# Patient Record
Sex: MALE | Race: BLACK OR AFRICAN AMERICAN | Employment: FULL TIME | URBAN - METROPOLITAN AREA
[De-identification: names, ages, dates, MRNs, and addresses within clinical notes are randomized per-mention and may not be internally consistent; named-entity substitution may affect disease eponyms.]

---

## 2018-04-16 ENCOUNTER — HOSPITAL ENCOUNTER (OUTPATIENT)
Dept: OTHER | Age: 48
Discharge: HOME | End: 2018-04-16
Attending: INTERNAL MEDICINE | Admitting: INTERNAL MEDICINE

## 2018-04-16 ENCOUNTER — OFFICE VISIT (OUTPATIENT)
Dept: OTHER | Age: 48
End: 2018-04-16

## 2018-04-16 VITALS
WEIGHT: 213 LBS | SYSTOLIC BLOOD PRESSURE: 122 MMHG | HEIGHT: 75 IN | DIASTOLIC BLOOD PRESSURE: 84 MMHG | BODY MASS INDEX: 26.49 KG/M2 | OXYGEN SATURATION: 98 % | HEART RATE: 95 BPM

## 2018-04-16 DIAGNOSIS — J45.20 MILD INTERMITTENT ASTHMA WITHOUT COMPLICATION: ICD-10-CM

## 2018-04-16 DIAGNOSIS — D50.0 IRON DEFICIENCY ANEMIA DUE TO CHRONIC BLOOD LOSS: Primary | ICD-10-CM

## 2018-04-16 DIAGNOSIS — K62.5 RECTAL BLEEDING: ICD-10-CM

## 2018-04-16 PROCEDURE — 99999 PR OFFICE/OUTPT VISIT,PROCEDURE ONLY: CPT | Performed by: INTERNAL MEDICINE

## 2018-04-16 RX ORDER — ATORVASTATIN CALCIUM 20 MG/1
20 TABLET, FILM COATED ORAL DAILY
COMMUNITY
End: 2018-04-30 | Stop reason: SDUPTHER

## 2018-04-16 RX ORDER — PNV NO.95/FERROUS FUM/FOLIC AC 28MG-0.8MG
TABLET ORAL 2 TIMES DAILY
COMMUNITY
End: 2022-01-27

## 2018-04-16 RX ORDER — ALBUTEROL SULFATE 90 UG/1
2 AEROSOL, METERED RESPIRATORY (INHALATION) EVERY 6 HOURS PRN
COMMUNITY
End: 2022-09-12 | Stop reason: SDUPTHER

## 2018-04-26 ENCOUNTER — TELEPHONE (OUTPATIENT)
Dept: OTHER | Age: 48
End: 2018-04-26

## 2018-04-26 RX ORDER — AMLODIPINE BESYLATE 5 MG/1
5 TABLET ORAL DAILY
COMMUNITY
End: 2018-04-30 | Stop reason: SDUPTHER

## 2018-04-26 RX ORDER — TAMSULOSIN HYDROCHLORIDE 0.4 MG/1
0.4 CAPSULE ORAL DAILY
COMMUNITY
End: 2018-04-30 | Stop reason: SDUPTHER

## 2018-04-26 RX ORDER — DOCUSATE SODIUM 100 MG/1
100 CAPSULE, LIQUID FILLED ORAL 2 TIMES DAILY
COMMUNITY
End: 2022-01-27

## 2018-04-30 ENCOUNTER — HOSPITAL ENCOUNTER (OUTPATIENT)
Dept: OTHER | Age: 48
Discharge: HOME | End: 2018-04-30
Attending: INTERNAL MEDICINE | Admitting: INTERNAL MEDICINE

## 2018-04-30 ENCOUNTER — OFFICE VISIT (OUTPATIENT)
Dept: OTHER | Age: 48
End: 2018-04-30

## 2018-04-30 VITALS
WEIGHT: 211 LBS | OXYGEN SATURATION: 97 % | DIASTOLIC BLOOD PRESSURE: 82 MMHG | SYSTOLIC BLOOD PRESSURE: 118 MMHG | BODY MASS INDEX: 26.37 KG/M2 | HEART RATE: 88 BPM

## 2018-04-30 DIAGNOSIS — K62.5 RECTAL BLEEDING: Primary | ICD-10-CM

## 2018-04-30 DIAGNOSIS — J45.20 MILD INTERMITTENT ASTHMA WITHOUT COMPLICATION: ICD-10-CM

## 2018-04-30 DIAGNOSIS — D50.0 IRON DEFICIENCY ANEMIA DUE TO CHRONIC BLOOD LOSS: ICD-10-CM

## 2018-04-30 DIAGNOSIS — K62.5 RECTAL BLEEDING: ICD-10-CM

## 2018-04-30 PROCEDURE — 99999 PR OFFICE/OUTPT VISIT,PROCEDURE ONLY: CPT | Performed by: INTERNAL MEDICINE

## 2018-04-30 RX ORDER — FLUTICASONE PROPIONATE 50 MCG
2 SPRAY, SUSPENSION (ML) NASAL NIGHTLY
Qty: 1 BOTTLE | Refills: 3 | Status: SHIPPED | OUTPATIENT
Start: 2018-04-30 | End: 2022-01-27

## 2018-04-30 RX ORDER — AMLODIPINE BESYLATE 5 MG/1
5 TABLET ORAL DAILY
Qty: 30 TABLET | Refills: 5 | Status: SHIPPED | OUTPATIENT
Start: 2018-04-30 | End: 2018-04-30 | Stop reason: SDUPTHER

## 2018-04-30 RX ORDER — AMLODIPINE BESYLATE 5 MG/1
5 TABLET ORAL DAILY
Qty: 30 TABLET | Refills: 5 | Status: SHIPPED | OUTPATIENT
Start: 2018-04-30 | End: 2022-01-27 | Stop reason: SDUPTHER

## 2018-04-30 RX ORDER — ATORVASTATIN CALCIUM 20 MG/1
20 TABLET, FILM COATED ORAL DAILY
Qty: 30 TABLET | Refills: 5 | Status: SHIPPED | OUTPATIENT
Start: 2018-04-30 | End: 2022-01-13 | Stop reason: SDUPTHER

## 2018-04-30 RX ORDER — TAMSULOSIN HYDROCHLORIDE 0.4 MG/1
0.4 CAPSULE ORAL DAILY
Qty: 30 CAPSULE | Refills: 5 | Status: SHIPPED | OUTPATIENT
Start: 2018-04-30 | End: 2018-04-30 | Stop reason: SDUPTHER

## 2018-04-30 RX ORDER — ATORVASTATIN CALCIUM 20 MG/1
20 TABLET, FILM COATED ORAL DAILY
Qty: 30 TABLET | Refills: 5 | Status: SHIPPED | OUTPATIENT
Start: 2018-04-30 | End: 2018-04-30 | Stop reason: SDUPTHER

## 2018-04-30 RX ORDER — TAMSULOSIN HYDROCHLORIDE 0.4 MG/1
0.4 CAPSULE ORAL DAILY
Qty: 30 CAPSULE | Refills: 5 | Status: SHIPPED | OUTPATIENT
Start: 2018-04-30 | End: 2022-01-27

## 2018-05-02 ENCOUNTER — HOSPITAL ENCOUNTER (OUTPATIENT)
Dept: SURGERY | Age: 48
Discharge: HOME | End: 2018-05-02
Attending: INTERNAL MEDICINE | Admitting: INTERNAL MEDICINE

## 2018-05-02 VITALS
OXYGEN SATURATION: 95 % | HEART RATE: 77 BPM | DIASTOLIC BLOOD PRESSURE: 79 MMHG | RESPIRATION RATE: 20 BRPM | HEIGHT: 76 IN | SYSTOLIC BLOOD PRESSURE: 112 MMHG | BODY MASS INDEX: 26.06 KG/M2 | TEMPERATURE: 98.6 F | WEIGHT: 214 LBS

## 2018-05-02 LAB
HCT VFR BLD CALC: 36.3 % (ref 40.5–52.5)
HEMOGLOBIN: 11.2 G/DL (ref 13.5–17.5)
MCH RBC QN AUTO: 23.4 PG (ref 26–34)
MCHC RBC AUTO-ENTMCNC: 30.8 G/DL (ref 31–36)
MCV RBC AUTO: 75.7 FL (ref 80–100)
PDW BLD-RTO: 33.2 % (ref 12.4–15.4)
PLATELET # BLD: 360 K/UL (ref 135–450)
PMV BLD AUTO: 8.5 FL (ref 5–10.5)
RBC # BLD: 4.79 M/UL (ref 4.2–5.9)
WBC # BLD: 3.5 K/UL (ref 4–11)

## 2018-05-02 RX ORDER — SODIUM CHLORIDE, SODIUM LACTATE, POTASSIUM CHLORIDE, CALCIUM CHLORIDE 600; 310; 30; 20 MG/100ML; MG/100ML; MG/100ML; MG/100ML
INJECTION, SOLUTION INTRAVENOUS CONTINUOUS
Status: DISCONTINUED | OUTPATIENT
Start: 2018-05-02 | End: 2018-05-03 | Stop reason: HOSPADM

## 2018-05-02 RX ADMIN — SODIUM CHLORIDE, SODIUM LACTATE, POTASSIUM CHLORIDE, CALCIUM CHLORIDE: 600; 310; 30; 20 INJECTION, SOLUTION INTRAVENOUS at 10:48

## 2018-05-02 ASSESSMENT — PAIN - FUNCTIONAL ASSESSMENT: PAIN_FUNCTIONAL_ASSESSMENT: 0-10

## 2018-05-02 ASSESSMENT — PAIN SCALES - GENERAL
PAINLEVEL_OUTOF10: 0
PAINLEVEL_OUTOF10: 0

## 2018-05-23 ENCOUNTER — HOSPITAL ENCOUNTER (OUTPATIENT)
Dept: SURGERY | Age: 48
Discharge: HOME | End: 2018-05-23
Attending: INTERNAL MEDICINE | Admitting: INTERNAL MEDICINE

## 2018-05-23 VITALS
OXYGEN SATURATION: 98 % | BODY MASS INDEX: 26.06 KG/M2 | RESPIRATION RATE: 16 BRPM | SYSTOLIC BLOOD PRESSURE: 120 MMHG | TEMPERATURE: 97.7 F | HEIGHT: 76 IN | DIASTOLIC BLOOD PRESSURE: 69 MMHG | HEART RATE: 80 BPM | WEIGHT: 214 LBS

## 2018-05-23 RX ORDER — SODIUM CHLORIDE, SODIUM LACTATE, POTASSIUM CHLORIDE, CALCIUM CHLORIDE 600; 310; 30; 20 MG/100ML; MG/100ML; MG/100ML; MG/100ML
INJECTION, SOLUTION INTRAVENOUS CONTINUOUS
Status: CANCELLED | OUTPATIENT
Start: 2018-05-23

## 2018-05-23 RX ORDER — ESOMEPRAZOLE MAGNESIUM 40 MG/1
40 CAPSULE, DELAYED RELEASE ORAL DAILY
Qty: 30 CAPSULE | Refills: 3 | Status: SHIPPED | OUTPATIENT
Start: 2018-05-23 | End: 2022-01-27

## 2018-05-23 ASSESSMENT — PAIN SCALES - GENERAL
PAINLEVEL_OUTOF10: 0
PAINLEVEL_OUTOF10: 0

## 2018-06-11 ENCOUNTER — HOSPITAL ENCOUNTER (OUTPATIENT)
Dept: OTHER | Age: 48
Discharge: HOME | End: 2018-06-12
Attending: INTERNAL MEDICINE | Admitting: INTERNAL MEDICINE

## 2018-06-11 ENCOUNTER — OFFICE VISIT (OUTPATIENT)
Dept: OTHER | Age: 48
End: 2018-06-11

## 2018-06-11 VITALS
BODY MASS INDEX: 25.69 KG/M2 | WEIGHT: 211 LBS | HEART RATE: 93 BPM | DIASTOLIC BLOOD PRESSURE: 88 MMHG | HEIGHT: 76 IN | SYSTOLIC BLOOD PRESSURE: 130 MMHG | OXYGEN SATURATION: 99 %

## 2018-06-11 DIAGNOSIS — D50.0 IRON DEFICIENCY ANEMIA DUE TO CHRONIC BLOOD LOSS: ICD-10-CM

## 2018-06-11 DIAGNOSIS — K62.5 RECTAL BLEEDING: Primary | ICD-10-CM

## 2018-06-11 DIAGNOSIS — K64.5 THROMBOSED HEMORRHOIDS: ICD-10-CM

## 2018-06-11 DIAGNOSIS — J45.20 MILD INTERMITTENT ASTHMA WITHOUT COMPLICATION: ICD-10-CM

## 2018-06-11 PROCEDURE — 99999 PR OFFICE/OUTPT VISIT,PROCEDURE ONLY: CPT | Performed by: INTERNAL MEDICINE

## 2018-06-18 ENCOUNTER — INITIAL CONSULT (OUTPATIENT)
Dept: SURGERY | Age: 48
End: 2018-06-18

## 2018-06-18 VITALS
HEART RATE: 61 BPM | WEIGHT: 204.8 LBS | SYSTOLIC BLOOD PRESSURE: 92 MMHG | DIASTOLIC BLOOD PRESSURE: 69 MMHG | BODY MASS INDEX: 24.94 KG/M2 | HEIGHT: 76 IN

## 2018-06-18 DIAGNOSIS — K64.8 OTHER HEMORRHOIDS: Primary | ICD-10-CM

## 2018-06-18 PROCEDURE — 99243 OFF/OP CNSLTJ NEW/EST LOW 30: CPT | Performed by: SURGERY

## 2018-06-18 RX ORDER — HYDROCORTISONE ACETATE 25 MG/1
25 SUPPOSITORY RECTAL DAILY
Qty: 14 SUPPOSITORY | Refills: 1 | Status: SHIPPED | OUTPATIENT
Start: 2018-06-18 | End: 2022-01-27 | Stop reason: ALTCHOICE

## 2018-06-20 PROBLEM — K64.8 OTHER HEMORRHOIDS: Status: ACTIVE | Noted: 2018-06-20

## 2021-12-30 ENCOUNTER — TELEPHONE (OUTPATIENT)
Dept: INTERNAL MEDICINE CLINIC | Age: 51
End: 2021-12-30

## 2022-01-13 ENCOUNTER — OFFICE VISIT (OUTPATIENT)
Dept: INTERNAL MEDICINE CLINIC | Age: 52
End: 2022-01-13

## 2022-01-13 ENCOUNTER — HOSPITAL ENCOUNTER (OUTPATIENT)
Age: 52
Discharge: HOME | End: 2022-01-13

## 2022-01-13 VITALS
BODY MASS INDEX: 26.3 KG/M2 | DIASTOLIC BLOOD PRESSURE: 72 MMHG | WEIGHT: 216 LBS | HEIGHT: 76 IN | SYSTOLIC BLOOD PRESSURE: 134 MMHG | OXYGEN SATURATION: 99 % | HEART RATE: 101 BPM

## 2022-01-13 DIAGNOSIS — E61.1 IRON DEFICIENCY: ICD-10-CM

## 2022-01-13 DIAGNOSIS — K64.5 THROMBOSED HEMORRHOIDS: Primary | ICD-10-CM

## 2022-01-13 DIAGNOSIS — I25.83 CORONARY ARTERY DISEASE DUE TO LIPID RICH PLAQUE: ICD-10-CM

## 2022-01-13 DIAGNOSIS — N40.1 BENIGN PROSTATIC HYPERPLASIA WITH URINARY HESITANCY: ICD-10-CM

## 2022-01-13 DIAGNOSIS — R39.11 BENIGN PROSTATIC HYPERPLASIA WITH URINARY HESITANCY: ICD-10-CM

## 2022-01-13 DIAGNOSIS — I25.10 CORONARY ARTERY DISEASE DUE TO LIPID RICH PLAQUE: ICD-10-CM

## 2022-01-13 LAB
A/G RATIO: 1.4 (ref 1.1–2.2)
ALBUMIN SERPL-MCNC: 4.4 G/DL (ref 3.4–5)
ALP BLD-CCNC: 64 U/L (ref 40–129)
ALT SERPL-CCNC: 27 U/L (ref 10–40)
ANION GAP SERPL CALCULATED.3IONS-SCNC: 12 MMOL/L (ref 3–16)
AST SERPL-CCNC: 29 U/L (ref 15–37)
BASOPHILS ABSOLUTE: 0.1 K/UL (ref 0–0.2)
BASOPHILS RELATIVE PERCENT: 1.7 %
BILIRUB SERPL-MCNC: <0.2 MG/DL (ref 0–1)
BUN BLDV-MCNC: 10 MG/DL (ref 7–20)
CALCIUM SERPL-MCNC: 9.4 MG/DL (ref 8.3–10.6)
CHLORIDE BLD-SCNC: 101 MMOL/L (ref 99–110)
CHOLESTEROL, TOTAL: 215 MG/DL (ref 0–199)
CO2: 22 MMOL/L (ref 21–32)
CREAT SERPL-MCNC: 1.1 MG/DL (ref 0.9–1.3)
EOSINOPHILS ABSOLUTE: 0.2 K/UL (ref 0–0.6)
EOSINOPHILS RELATIVE PERCENT: 4.7 %
GFR AFRICAN AMERICAN: >60
GFR NON-AFRICAN AMERICAN: >60
GLUCOSE BLD-MCNC: 96 MG/DL (ref 70–99)
HCT VFR BLD CALC: 31.7 % (ref 40.5–52.5)
HDLC SERPL-MCNC: 42 MG/DL (ref 40–60)
HEMOGLOBIN: 9.9 G/DL (ref 13.5–17.5)
LDL CHOLESTEROL CALCULATED: ABNORMAL MG/DL
LDL CHOLESTEROL DIRECT: 66 MG/DL
LYMPHOCYTES ABSOLUTE: 1.2 K/UL (ref 1–5.1)
LYMPHOCYTES RELATIVE PERCENT: 22.7 %
MCH RBC QN AUTO: 25.9 PG (ref 26–34)
MCHC RBC AUTO-ENTMCNC: 31.3 G/DL (ref 31–36)
MCV RBC AUTO: 82.7 FL (ref 80–100)
MONOCYTES ABSOLUTE: 0.7 K/UL (ref 0–1.3)
MONOCYTES RELATIVE PERCENT: 13.7 %
NEUTROPHILS ABSOLUTE: 2.9 K/UL (ref 1.7–7.7)
NEUTROPHILS RELATIVE PERCENT: 57.2 %
PDW BLD-RTO: 17.3 % (ref 12.4–15.4)
PLATELET # BLD: 334 K/UL (ref 135–450)
PMV BLD AUTO: 7 FL (ref 5–10.5)
POTASSIUM SERPL-SCNC: 4.5 MMOL/L (ref 3.5–5.1)
PROSTATE SPECIFIC ANTIGEN: 0.77 NG/ML (ref 0–4)
RBC # BLD: 3.83 M/UL (ref 4.2–5.9)
SODIUM BLD-SCNC: 135 MMOL/L (ref 136–145)
TOTAL PROTEIN: 7.5 G/DL (ref 6.4–8.2)
TRIGL SERPL-MCNC: 618 MG/DL (ref 0–150)
VLDLC SERPL CALC-MCNC: ABNORMAL MG/DL
WBC # BLD: 5.1 K/UL (ref 4–11)

## 2022-01-13 PROCEDURE — 80053 COMPREHEN METABOLIC PANEL: CPT

## 2022-01-13 PROCEDURE — 36415 COLL VENOUS BLD VENIPUNCTURE: CPT

## 2022-01-13 PROCEDURE — 83721 ASSAY OF BLOOD LIPOPROTEIN: CPT

## 2022-01-13 PROCEDURE — 85025 COMPLETE CBC W/AUTO DIFF WBC: CPT

## 2022-01-13 PROCEDURE — 80061 LIPID PANEL: CPT

## 2022-01-13 PROCEDURE — 84153 ASSAY OF PSA TOTAL: CPT

## 2022-01-13 PROCEDURE — 99214 OFFICE O/P EST MOD 30 MIN: CPT | Performed by: INTERNAL MEDICINE

## 2022-01-13 RX ORDER — ATORVASTATIN CALCIUM 40 MG/1
40 TABLET, FILM COATED ORAL DAILY
Qty: 30 TABLET | Refills: 5 | Status: SHIPPED | OUTPATIENT
Start: 2022-01-13

## 2022-01-13 NOTE — PATIENT INSTRUCTIONS
1. Rectal bleeding consistent with hemorrhoids:  You may have a thrombosed hemorrhoid which may require surgery. Continue to use Preparation H with witch hazel three times per day per rectum for now.  We will refer to general surgery for evaluation. 2. History of iron deficiency anemia:   We will check CBC (blood count) and iron levels today. 3. History of coronary artery disease with a history of myocardial infarction:  Continue taking aspirin 81 mg daily. START back taking atorvastatin (Lipitor) 40 mg every evening.    4. Hypertension:   Blood pressure is reasonable today at 134/72 (goal less than 140/90).         Follow-up in two weeks  LABS: CBC, CMP, lipid panel, PSA

## 2022-01-13 NOTE — PROGRESS NOTES
SUBJECTIVE:   Follow up, last visit 2018. Taking aspirin 81 mg daily and vitamin. No other medication.        History of present illness:   History of rectal bleeding for the past 8 years intermittently, usually after a bowel movement. Yanique Garcia thinks he may have polyps, which may be hemorrhoids.  He is usually follows at the [facility] ([address] [facility], Dr. Gunjan Benjamin), last seen in mid February.  Blood tests apparently showed anemia, started on iron tablet three weeks ago. Richard Martinouch to get a colonoscopy due to cost and sliding fee Elisha Huff was referred to [facility] by employee.  History of MI fours years ago, treated at [address] [place] [address] with angioplasty. Yanique Garcia is on aspirin and atorvastatin. Yanique Garcia is active smoker currently at 2 cigs/day with a 20 pk/yr smoking history.  He uses an albuterol inhaler infrequently 2-4 times per month.  Great uncle  of colon cancer age 62s.  No other family history of colorectal cancer.  Mother with lung cancer (smoker). Colonoscopy  and EGD  (Dr. Sebastián Jimenez). Colonoscopy showed diverticulosis and hemorrhoids. EGD c/w gastritis, started on Nexium by Dr. Sebastián Jimenez.            EGD () Esophagus: abnormal: with Rings without furrows in the [address], suggestive of eosinophilic esophagitis, biopsied. Apparent erosion at gastroesophageal junction, biopsied. The findings do not support a diagnosis of Ballesteros's Esophagus.   Stomach: abnormal: small hiatal hernia; apparent antral gastritis, biopsied  Duodenum: normal    MEDICATIONS:  esomeprazole (NEXIUM) 40 MG capsule Take 1 capsule by mouth daily (not taking)   fluticasone (FLONASE) 50 MCG/ACT nasal spray 2 sprays by Nasal route nightly (not taking)   amLODIPine (NORVASC) 5 MG tablet Take 1 tablet by mouth daily (not taking)   atorvastatin (LIPITOR) 20 MG tablet Take 1 tablet by mouth daily (not taking)   tamsulosin (FLOMAX) 0.4 MG capsule Take 1 capsule by mouth daily (not taking)   docusate sodium (COLACE) 100 MG capsule Take 100 mg by mouth 2 times daily (not taking)   albuterol sulfate  MCG/ACT Inhale 2 puffs into the lungs q6hr PRN (not using)   Ferrous Sulfate (IRON) 325 (65 Fe) MG TABS Take by mouth 2 times daily (not taking)   aspirin 81 MG tablet Take 81 mg by mouth daily      No current facility-administered medications for this visit. LABS: 01/13/2022  WBC 5.1   HGB 9.9 (L)      MCV 82.7      (L)   K 4.5      CO2 22   BUN 10   CREATININE 1.1   GLUCOSE 96     Calcium 9.4    Total Protein 7.5    Albumin 4.4    Total Bilirubin <0.2    Alkaline Phosphatase 64    ALT 27    AST 29      Total Cholesterol 215 (H)   Triglycerides 618 (H)   HDL 42    Direct LDL  66        PSA (1/13) 0.77 ng/mL      OBJECTIVE:    /72 (Site: Right Upper Arm, Position: Sitting)   Pulse 101   Ht 6' 4\" (1.93 m)   Wt 216 lb (98 kg)   SpO2 99%   BMI 26.29 kg/m²   HEENT:  Oropharynx clear, no lymphadenopathy,   LUNGS:  Clear and without wheezes  HEART:  Regular rhythm, no appreciable murmur  ABD:  Benign, active bowel sounds  EXT:  No edema, pulses palpable  NEURO:  No focal neurologic deficits noted. ASSESSMENT / PLAN:   1. Rectal bleeding consistent with hemorrhoids:  You may have a thrombosed hemorrhoid which may require surgery. Continue to use Preparation H with witch hazel three times per day per rectum for now.  We will refer to general surgery for evaluation. 2. History of iron deficiency anemia:   Normocytic anemia with Hgb 9.9 gm/dL today. Check iron studies and B12 level. 3. History of coronary artery disease with a history of myocardial infarction:  Continue taking aspirin 81 mg daily. START back taking atorvastatin (Lipitor) 40 mg every evening.  Direct LDL 66 mg/dL. Recheck fasting triglyceride level. 4. Hypertension:   Blood pressure is borderline today at 134/72 (goal less than 130/80). Will consider starting back on amlodipine.        Follow-up in two weeks  LABS: CBC, CMP, lipid panel, PSA

## 2022-01-27 ENCOUNTER — OFFICE VISIT (OUTPATIENT)
Dept: INTERNAL MEDICINE CLINIC | Age: 52
End: 2022-01-27

## 2022-01-27 ENCOUNTER — HOSPITAL ENCOUNTER (OUTPATIENT)
Age: 52
Discharge: HOME | End: 2022-01-27
Payer: COMMERCIAL

## 2022-01-27 VITALS — SYSTOLIC BLOOD PRESSURE: 140 MMHG | BODY MASS INDEX: 26.29 KG/M2 | DIASTOLIC BLOOD PRESSURE: 80 MMHG | HEIGHT: 76 IN

## 2022-01-27 DIAGNOSIS — E78.1 HYPERTRIGLYCERIDEMIA: ICD-10-CM

## 2022-01-27 DIAGNOSIS — K64.5 THROMBOSED HEMORRHOIDS: ICD-10-CM

## 2022-01-27 DIAGNOSIS — D50.0 IRON DEFICIENCY ANEMIA DUE TO CHRONIC BLOOD LOSS: ICD-10-CM

## 2022-01-27 DIAGNOSIS — F51.01 PRIMARY INSOMNIA: ICD-10-CM

## 2022-01-27 DIAGNOSIS — N40.1 BENIGN PROSTATIC HYPERPLASIA WITH URINARY HESITANCY: ICD-10-CM

## 2022-01-27 DIAGNOSIS — F41.8 ANXIOUS DEPRESSION: ICD-10-CM

## 2022-01-27 DIAGNOSIS — E78.1 HYPERTRIGLYCERIDEMIA: Primary | ICD-10-CM

## 2022-01-27 DIAGNOSIS — R39.11 BENIGN PROSTATIC HYPERPLASIA WITH URINARY HESITANCY: ICD-10-CM

## 2022-01-27 LAB
IRON SATURATION: 5 % (ref 20–50)
IRON: 25 UG/DL (ref 59–158)
TOTAL IRON BINDING CAPACITY: 513 UG/DL (ref 260–445)
TRIGL SERPL-MCNC: 100 MG/DL (ref 0–150)

## 2022-01-27 PROCEDURE — 36415 COLL VENOUS BLD VENIPUNCTURE: CPT

## 2022-01-27 PROCEDURE — 83540 ASSAY OF IRON: CPT

## 2022-01-27 PROCEDURE — 83550 IRON BINDING TEST: CPT

## 2022-01-27 PROCEDURE — 99214 OFFICE O/P EST MOD 30 MIN: CPT | Performed by: INTERNAL MEDICINE

## 2022-01-27 PROCEDURE — 84478 ASSAY OF TRIGLYCERIDES: CPT

## 2022-01-27 RX ORDER — CITALOPRAM 20 MG/1
20 TABLET ORAL DAILY
Qty: 30 TABLET | Refills: 5 | Status: SHIPPED | OUTPATIENT
Start: 2022-01-27

## 2022-01-27 RX ORDER — AMLODIPINE BESYLATE 5 MG/1
5 TABLET ORAL DAILY
Qty: 30 TABLET | Refills: 5 | Status: SHIPPED | OUTPATIENT
Start: 2022-01-27

## 2022-01-27 NOTE — PROGRESS NOTES
SUBJECTIVE:  Follow up from  for rectal bleeding and iron deficiency anemia.       History of present illness:   History of rectal bleeding for the past 8 years intermittently, usually after a bowel movement. Rose Mary Freeman thinks he may have polyps, which may be hemorrhoids.  He is usually follows at the [facility] ([address] [facility], Dr. Darlene Negrete), last seen in mid February.  Blood tests apparently showed anemia, started on iron tablet three weeks ago. Jimena Serrano to get a colonoscopy due to cost and sliding fee Amy Hoover was referred to [facility] by employee.  History of MI fours years ago, treated at [address] [place] [address] with angioplasty. Rose Mary Freeman is on aspirin and atorvastatin. Rose Mary Freeman is active smoker currently at 2 cigs/day with a 20 pk/yr smoking history.  He uses an albuterol inhaler infrequently 2-4 times per month.  Great uncle  of colon cancer age 62s.  No other family history of colorectal cancer.  Mother with lung cancer (smoker).       Colonoscopy  and EGD  (Dr. Quinten Carl).    Colonoscopy showed diverticulosis and hemorrhoids.  EGD c/w gastritis, started on Nexium by Dr. Quinten Carl.             EGD () Esophagus: abnormal: with Rings without furrows in the [address], suggestive of eosinophilic esophagitis, biopsied. Apparent erosion at gastroesophageal junction, biopsied. The findings do not support a diagnosis of Ballesteros's Esophagus.   Stomach: abnormal: small hiatal hernia; apparent antral gastritis, biopsied  Duodenum: normal    MEDICATIONS:  atorvastatin (LIPITOR) 40 MG tablet Take 1 tablet by mouth daily Unsure of his strength   esomeprazole (NEXIUM) 40 MG delayed release capsule Take 1 capsule by mouth daily (not taking)   fluticasone (FLONASE) 50 MCG/ACT nasal spray 2 sprays by Nasal route nightly (not  Taking)   amLODIPine (NORVASC) 5 MG tablet Take 1 tablet by mouth daily (not taking   tamsulosin (FLOMAX) 0.4 MG capsule Take 1 capsule by mouth daily (not taking)   docusate sodium (COLACE) 100 MG capsule Take 100 mg by mouth 2 times daily (Patient not taking: Reported on 1/13/2022)   Multiple Vitamins-Minerals (MENS MULTIPLUS PO) Take by mouth   albuterol sulfate HFA (PROVENTIL HFA) 108 (90 Base) MCG/ACT inhaler Inhale 2 puffs into the lungs every 6 hours PRN (not taking)   Ferrous Sulfate (IRON) 325 (65 Fe) MG TABS Take by mouth 2 times daily (not taking)   aspirin 81 MG tablet Take 81 mg by mouth daily        LABS: 01/13/2022  WBC 5.1   HGB 9.9 (L)      MCV 82.7       (L)   K 4.5      CO2 22   BUN 10   CREATININE 1.1   GLUCOSE 96      Calcium 9.4    Total Protein 7.5    Albumin 4.4    Total Bilirubin <0.2    Alkaline Phosphatase 64    ALT 27    AST 29       Total Cholesterol 215 (H)   Triglycerides 618 (H)   HDL 42    Direct LDL  66      Triglycerides (1/27) 100 mg/dL    PSA (1/13) 0.77 ng/mL  Fe / TIBC (1/27) 25 / 513 = 5% iron saturation       OBJECTIVE:    BP (!) 140/80 (Site: Left Upper Arm, Position: Sitting)   Ht 6' 4\" (1.93 m)   BMI 26.29 kg/m²   HEENT:  Oropharynx clear, no lymphadenopathy,   LUNGS:  Clear and without wheezes  HEART:  Regular rhythm, no appreciable murmur  ABD:  Benign, active bowel sounds  EXT:  No edema, pulses palpable  NEURO:  No focal neurologic deficits noted. ASSESSMENT / PLAN:   1. Rectal bleeding consistent with hemorrhoids:  You may have a thrombosed hemorrhoid which may require surgery. Continue to use Preparation H with witch hazel three times per day per rectum for now.  General surgery referral pending. 2. History of iron deficiency anemia:   Normocytic anemia with Hgb 9.9 gm/dL (Jan 13) and iron deficiency with iron saturation 5%. Recommend IV Venofer infusion 200 mg daily for three days. 3. History of coronary artery disease with a history of myocardial infarction:  Continue taking aspirin 81 mg daily and atorvastatin (Lipitor) 40 mg every evening.  Direct LDL 66 mg/dL (goal less than 70).   Recheck fasting triglyceride level is normal at 100 mg/dL. 4. Hypertension:   Blood pressure is elevated again today at 140/80 (goal less than 130/80). Start back taking amlodipine (Norvasc) 5 mg every evening. 5. History of prostatism with urinary hesitancy:  PSA is NORMAL at 0.77 ng/mL (goal less than 4.0). No need for tamsulosin (Flomax). 6. Anxious depression due to serotonin depletion with insomnia:  START taking citalopram (Celexa) 10 mg (half tablet) every morning for a week, then 20 mg (whole tablet) every morning.         Follow-up in three weeks

## 2022-01-27 NOTE — PATIENT INSTRUCTIONS
1. Rectal bleeding consistent with hemorrhoids:  You may have a thrombosed hemorrhoid which may require surgery. Continue to use Preparation H with witch hazel three times per day per rectum for now.  General surgery referral pending. 2. History of iron deficiency anemia:   Normocytic anemia with Hgb 9.9 gm/dL (Jan 13). Check iron studies and B12 level today. 3. History of coronary artery disease with a history of myocardial infarction:  Continue taking aspirin 81 mg daily and atorvastatin (Lipitor) 40 mg every evening.  Direct LDL 66 mg/dL (goal less than 70). Recheck fasting triglyceride level today. 4. Hypertension:   Blood pressure is elevated again today at 140/80 (goal less than 130/80). Start back taking amlodipine (Norvasc) 5 mg every evening. 5. History of prostatism with urinary hesitancy:  PSA is NORMAL at 0.77 ng/mL (goal less than 4.0). No need for tamsulosin (Flomax). 6. Serotonin depletion and sleeping:  START taking citalopram (Celexa) 10 mg (half tablet) every morning for a week, then 20 mg (whole tablet) every morning.         Follow-up in three weeks

## 2022-02-17 PROBLEM — E55.9 VITAMIN D DEFICIENCY: Status: ACTIVE | Noted: 2019-10-29

## 2022-02-21 ENCOUNTER — OFFICE VISIT (OUTPATIENT)
Dept: SURGERY | Age: 52
End: 2022-02-21
Payer: COMMERCIAL

## 2022-02-21 ENCOUNTER — OFFICE VISIT (OUTPATIENT)
Dept: INTERNAL MEDICINE CLINIC | Age: 52
End: 2022-02-21

## 2022-02-21 VITALS
DIASTOLIC BLOOD PRESSURE: 69 MMHG | BODY MASS INDEX: 27.35 KG/M2 | TEMPERATURE: 98.3 F | HEIGHT: 75 IN | HEART RATE: 96 BPM | WEIGHT: 220 LBS | SYSTOLIC BLOOD PRESSURE: 119 MMHG

## 2022-02-21 VITALS
HEART RATE: 89 BPM | SYSTOLIC BLOOD PRESSURE: 100 MMHG | OXYGEN SATURATION: 97 % | BODY MASS INDEX: 27 KG/M2 | DIASTOLIC BLOOD PRESSURE: 60 MMHG | HEIGHT: 75 IN

## 2022-02-21 DIAGNOSIS — D50.0 IRON DEFICIENCY ANEMIA DUE TO CHRONIC BLOOD LOSS: Primary | ICD-10-CM

## 2022-02-21 DIAGNOSIS — R39.11 BENIGN PROSTATIC HYPERPLASIA WITH URINARY HESITANCY: ICD-10-CM

## 2022-02-21 DIAGNOSIS — D63.8 ANEMIA IN OTHER CHRONIC DISEASES CLASSIFIED ELSEWHERE: ICD-10-CM

## 2022-02-21 DIAGNOSIS — K64.8 OTHER HEMORRHOIDS: Primary | ICD-10-CM

## 2022-02-21 DIAGNOSIS — N40.1 BENIGN PROSTATIC HYPERPLASIA WITH URINARY HESITANCY: ICD-10-CM

## 2022-02-21 DIAGNOSIS — K64.5 THROMBOSED HEMORRHOIDS: ICD-10-CM

## 2022-02-21 DIAGNOSIS — F41.8 ANXIOUS DEPRESSION: ICD-10-CM

## 2022-02-21 PROBLEM — D64.89 OTHER SPECIFIED ANEMIAS: Status: ACTIVE | Noted: 2022-02-21

## 2022-02-21 PROCEDURE — 99214 OFFICE O/P EST MOD 30 MIN: CPT | Performed by: INTERNAL MEDICINE

## 2022-02-21 PROCEDURE — 99212 OFFICE O/P EST SF 10 MIN: CPT | Performed by: SURGERY

## 2022-02-21 NOTE — PROGRESS NOTES
(L)      MCV 82.7       (L)   K 4.5      CO2 22   BUN 10   CREATININE 1.1   GLUCOSE 96      Calcium 9.4    Total Protein 7.5    Albumin 4.4    Total Bilirubin <0.2    Alkaline Phosphatase 64    ALT 27    AST 29       Total Cholesterol 215 (H)   Triglycerides 618 (H)   HDL 42    Direct LDL  66       Triglycerides (1/27) 100 mg/dL     PSA (1/13) 0.77 ng/mL  Fe / TIBC (1/27) 25 / 513 = 5% iron saturation     OBJECTIVE:    /60   Pulse 89   Ht 6' 3\" (1.905 m)   SpO2 97%   BMI 27.00 kg/m²   HEENT:  Oropharynx clear, no lymphadenopathy,   LUNGS:  Clear and without wheezes  HEART:  Regular rhythm, no appreciable murmur  ABD:  Benign, active bowel sounds  EXT:  No edema, pulses palpable  NEURO:  No focal neurologic deficits noted. ASSESSMENT / PLAN:   1. Rectal bleeding consistent with hemorrhoids:  You may have a thrombosed hemorrhoid which may require surgery. Continue to use Preparation H with witch hazel three times per day per rectum for now.  General surgery referral pending today (2/21) at 1:30 pm.    2. History of iron deficiency anemia:   Normocytic anemia with Hgb 9.9 gm/dL (Jan 13) and iron deficiency with iron saturation 5%. Recommend IV Venofer infusion 200 mg daily for three days. 3. History of coronary artery disease with a history of myocardial infarction:  Continue taking aspirin 81 mg daily and atorvastatin (Lipitor) 40 mg every evening.  Direct LDL 66 mg/dL (goal less than 70).  Recheck fasting triglyceride level is normal at 100 mg/dL. 4. Hypertension:   Blood pressure is low today at 100/60 (goal less than 130/80).    Continue taking amlodipine (Norvasc) 5 mg every evening. 5. History of prostatism with urinary hesitancy:  PSA is NORMAL at 0.77 ng/mL (goal less than 4.0). No need for tamsulosin (Flomax). 6. Anxious depression due to serotonin depletion with insomnia:  Continue taking citalopram (Celexa) but decrease back 10 mg (half tablet) every evening. Follow-up in two weeks after IV Venofer iron infusions and seen by surgery

## 2022-02-21 NOTE — PATIENT INSTRUCTIONS
1. Rectal bleeding consistent with hemorrhoids:  You may have a thrombosed hemorrhoid which may require surgery. Continue to use Preparation H with witch hazel three times per day per rectum for now.  General surgery referral pending today (2/21) at 1:30 pm.    2. History of iron deficiency anemia:   Normocytic anemia with Hgb 9.9 gm/dL (Jan 13) and iron deficiency with iron saturation 5%. Recommend IV Venofer infusion 200 mg daily for three days. 3. History of coronary artery disease with a history of myocardial infarction:  Continue taking aspirin 81 mg daily and atorvastatin (Lipitor) 40 mg every evening.  Direct LDL 66 mg/dL (goal less than 70).  Recheck fasting triglyceride level is normal at 100 mg/dL. 4. Hypertension:   Blood pressure is low today at 100/60 (goal less than 130/80).    Continue taking amlodipine (Norvasc) 5 mg every evening. 5. History of prostatism with urinary hesitancy:  PSA is NORMAL at 0.77 ng/mL (goal less than 4.0). No need for tamsulosin (Flomax). 6. Anxious depression due to serotonin depletion with insomnia:  Continue taking citalopram (Celexa) but decrease back 10 mg (half tablet) every evening.        Follow-up in two weeks after IV Venofer iron infusions and seen by surgery

## 2022-02-24 NOTE — PROGRESS NOTES
HPI: Nursing notes reviewed. Patient with continued intermittent bleeding. Not much pain. Bleeding can be a lot at times. No fevers or chills. ROS:  10 point review of systems performed with pertinent positives in HPI    Phys:    Rectum:  Large prolapsed internal hemorrhoid, no gross blood    Assesment: 44 yo with hermorrhoids    Plan: 1. Given predominance of bleeding and not much pain looks like intermittent prolapse of internal hemorrhoid. Given size, continued symptoms and lack of resolution with other measures will plan for excision in OR. 2.  Discussed their diagnosis and indications for operation. Risks of operation and typical recovery reviewed. Plan for OR soon.     Merry Giles MD

## 2022-03-10 ENCOUNTER — HOSPITAL ENCOUNTER (OUTPATIENT)
Dept: NURSING | Age: 52
Setting detail: INFUSION SERIES
End: 2022-03-10

## 2022-03-11 ENCOUNTER — HOSPITAL ENCOUNTER (OUTPATIENT)
Dept: NURSING | Age: 52
Setting detail: INFUSION SERIES
Discharge: HOME | End: 2022-03-11
Payer: COMMERCIAL

## 2022-03-11 VITALS
HEART RATE: 94 BPM | DIASTOLIC BLOOD PRESSURE: 90 MMHG | WEIGHT: 210 LBS | OXYGEN SATURATION: 98 % | HEIGHT: 76 IN | TEMPERATURE: 98.3 F | RESPIRATION RATE: 18 BRPM | BODY MASS INDEX: 25.57 KG/M2 | SYSTOLIC BLOOD PRESSURE: 136 MMHG

## 2022-03-11 DIAGNOSIS — D63.8 ANEMIA IN OTHER CHRONIC DISEASES CLASSIFIED ELSEWHERE: Primary | ICD-10-CM

## 2022-03-11 PROCEDURE — 2580000003 HC RX 258: Performed by: INTERNAL MEDICINE

## 2022-03-11 PROCEDURE — 96365 THER/PROPH/DIAG IV INF INIT: CPT

## 2022-03-11 PROCEDURE — 99211 OFF/OP EST MAY X REQ PHY/QHP: CPT

## 2022-03-11 PROCEDURE — 6360000002 HC RX W HCPCS: Performed by: INTERNAL MEDICINE

## 2022-03-11 RX ORDER — ASCORBIC ACID 500 MG
1000 TABLET ORAL DAILY
COMMUNITY

## 2022-03-11 RX ORDER — OMEGA-3S/DHA/EPA/FISH OIL/D3 300MG-1000
400 CAPSULE ORAL DAILY
COMMUNITY

## 2022-03-11 RX ADMIN — IRON SUCROSE 200 MG: 20 INJECTION, SOLUTION INTRAVENOUS at 13:38

## 2022-03-11 ASSESSMENT — PAIN - FUNCTIONAL ASSESSMENT: PAIN_FUNCTIONAL_ASSESSMENT: 0-10

## 2022-03-11 NOTE — PROGRESS NOTES
Pt tolerated infusions without any complications. Education given and AVS signed.  Discharged in stable condition

## 2022-03-18 ENCOUNTER — HOSPITAL ENCOUNTER (OUTPATIENT)
Dept: NURSING | Age: 52
Setting detail: INFUSION SERIES
Discharge: HOME | End: 2022-03-18
Payer: COMMERCIAL

## 2022-03-18 VITALS
TEMPERATURE: 97.4 F | WEIGHT: 213 LBS | SYSTOLIC BLOOD PRESSURE: 138 MMHG | HEIGHT: 76 IN | HEART RATE: 94 BPM | DIASTOLIC BLOOD PRESSURE: 85 MMHG | RESPIRATION RATE: 16 BRPM | OXYGEN SATURATION: 97 % | BODY MASS INDEX: 25.94 KG/M2

## 2022-03-18 DIAGNOSIS — D63.8 ANEMIA IN OTHER CHRONIC DISEASES CLASSIFIED ELSEWHERE: Primary | ICD-10-CM

## 2022-03-18 PROCEDURE — 96365 THER/PROPH/DIAG IV INF INIT: CPT

## 2022-03-18 PROCEDURE — 6360000002 HC RX W HCPCS: Performed by: INTERNAL MEDICINE

## 2022-03-18 PROCEDURE — 2580000003 HC RX 258: Performed by: INTERNAL MEDICINE

## 2022-03-18 PROCEDURE — 99211 OFF/OP EST MAY X REQ PHY/QHP: CPT

## 2022-03-18 RX ADMIN — IRON SUCROSE 200 MG: 20 INJECTION, SOLUTION INTRAVENOUS at 13:24

## 2022-03-18 ASSESSMENT — PAIN - FUNCTIONAL ASSESSMENT: PAIN_FUNCTIONAL_ASSESSMENT: 0-10

## 2022-03-22 ENCOUNTER — HOSPITAL ENCOUNTER (OUTPATIENT)
Dept: NURSING | Age: 52
Setting detail: INFUSION SERIES
Discharge: HOME | End: 2022-03-22
Payer: COMMERCIAL

## 2022-03-22 VITALS
RESPIRATION RATE: 16 BRPM | SYSTOLIC BLOOD PRESSURE: 143 MMHG | OXYGEN SATURATION: 97 % | WEIGHT: 220 LBS | HEART RATE: 95 BPM | BODY MASS INDEX: 26.79 KG/M2 | HEIGHT: 76 IN | DIASTOLIC BLOOD PRESSURE: 94 MMHG | TEMPERATURE: 97.3 F

## 2022-03-22 DIAGNOSIS — D63.8 ANEMIA IN OTHER CHRONIC DISEASES CLASSIFIED ELSEWHERE: Primary | ICD-10-CM

## 2022-03-22 PROCEDURE — 96365 THER/PROPH/DIAG IV INF INIT: CPT

## 2022-03-22 PROCEDURE — 6360000002 HC RX W HCPCS: Performed by: INTERNAL MEDICINE

## 2022-03-22 PROCEDURE — 99211 OFF/OP EST MAY X REQ PHY/QHP: CPT

## 2022-03-22 PROCEDURE — 2580000003 HC RX 258: Performed by: INTERNAL MEDICINE

## 2022-03-22 RX ADMIN — SODIUM CHLORIDE 200 MG: 9 INJECTION, SOLUTION INTRAVENOUS at 11:23

## 2022-03-22 ASSESSMENT — PAIN - FUNCTIONAL ASSESSMENT: PAIN_FUNCTIONAL_ASSESSMENT: 0-10

## 2022-09-12 ENCOUNTER — HOSPITAL ENCOUNTER (OUTPATIENT)
Age: 52
Discharge: HOME | End: 2022-09-12
Payer: COMMERCIAL

## 2022-09-12 ENCOUNTER — OFFICE VISIT (OUTPATIENT)
Dept: INTERNAL MEDICINE CLINIC | Age: 52
End: 2022-09-12

## 2022-09-12 VITALS
HEART RATE: 87 BPM | WEIGHT: 210 LBS | BODY MASS INDEX: 25.57 KG/M2 | DIASTOLIC BLOOD PRESSURE: 82 MMHG | SYSTOLIC BLOOD PRESSURE: 126 MMHG | OXYGEN SATURATION: 97 % | HEIGHT: 76 IN

## 2022-09-12 DIAGNOSIS — J45.20 MILD INTERMITTENT ASTHMA WITHOUT COMPLICATION: ICD-10-CM

## 2022-09-12 DIAGNOSIS — D17.0 LIPOMA OF NECK: ICD-10-CM

## 2022-09-12 DIAGNOSIS — I10 ESSENTIAL HYPERTENSION: Primary | ICD-10-CM

## 2022-09-12 DIAGNOSIS — E78.2 MIXED HYPERLIPIDEMIA: ICD-10-CM

## 2022-09-12 DIAGNOSIS — F17.200 TOBACCO USE DISORDER: ICD-10-CM

## 2022-09-12 DIAGNOSIS — K62.5 RECTAL BLEEDING: ICD-10-CM

## 2022-09-12 DIAGNOSIS — I21.3 ST ELEVATION MYOCARDIAL INFARCTION (STEMI), UNSPECIFIED ARTERY (HCC): ICD-10-CM

## 2022-09-12 LAB
ANION GAP SERPL CALCULATED.3IONS-SCNC: 15 MMOL/L (ref 3–16)
BASOPHILS ABSOLUTE: 0 K/UL (ref 0–0.2)
BASOPHILS RELATIVE PERCENT: 1.4 %
BUN BLDV-MCNC: 12 MG/DL (ref 7–20)
CALCIUM SERPL-MCNC: 9.2 MG/DL (ref 8.3–10.6)
CHLORIDE BLD-SCNC: 104 MMOL/L (ref 99–110)
CHOLESTEROL, TOTAL: 128 MG/DL (ref 0–199)
CO2: 21 MMOL/L (ref 21–32)
CREAT SERPL-MCNC: 1 MG/DL (ref 0.9–1.3)
EOSINOPHILS ABSOLUTE: 0.2 K/UL (ref 0–0.6)
EOSINOPHILS RELATIVE PERCENT: 4.9 %
GFR AFRICAN AMERICAN: >60
GFR NON-AFRICAN AMERICAN: >60
GLUCOSE BLD-MCNC: 106 MG/DL (ref 70–99)
HCT VFR BLD CALC: 35.8 % (ref 40.5–52.5)
HDLC SERPL-MCNC: 38 MG/DL (ref 40–60)
HEMOGLOBIN: 12.3 G/DL (ref 13.5–17.5)
LDL CHOLESTEROL CALCULATED: 30 MG/DL
LYMPHOCYTES ABSOLUTE: 1.4 K/UL (ref 1–5.1)
LYMPHOCYTES RELATIVE PERCENT: 43.4 %
MCH RBC QN AUTO: 31.9 PG (ref 26–34)
MCHC RBC AUTO-ENTMCNC: 34.4 G/DL (ref 31–36)
MCV RBC AUTO: 92.6 FL (ref 80–100)
MONOCYTES ABSOLUTE: 0.5 K/UL (ref 0–1.3)
MONOCYTES RELATIVE PERCENT: 16.1 %
NEUTROPHILS ABSOLUTE: 1.1 K/UL (ref 1.7–7.7)
NEUTROPHILS RELATIVE PERCENT: 34.2 %
PDW BLD-RTO: 15.1 % (ref 12.4–15.4)
PLATELET # BLD: 269 K/UL (ref 135–450)
PMV BLD AUTO: 8.3 FL (ref 5–10.5)
POTASSIUM SERPL-SCNC: 4 MMOL/L (ref 3.5–5.1)
RBC # BLD: 3.87 M/UL (ref 4.2–5.9)
SODIUM BLD-SCNC: 140 MMOL/L (ref 136–145)
TRIGL SERPL-MCNC: 298 MG/DL (ref 0–150)
VLDLC SERPL CALC-MCNC: 60 MG/DL
WBC # BLD: 3.3 K/UL (ref 4–11)

## 2022-09-12 PROCEDURE — 80061 LIPID PANEL: CPT

## 2022-09-12 PROCEDURE — 36415 COLL VENOUS BLD VENIPUNCTURE: CPT

## 2022-09-12 PROCEDURE — 80048 BASIC METABOLIC PNL TOTAL CA: CPT

## 2022-09-12 PROCEDURE — 85025 COMPLETE CBC W/AUTO DIFF WBC: CPT

## 2022-09-12 RX ORDER — VARENICLINE TARTRATE 0.5 MG/1
.5-1 TABLET, FILM COATED ORAL SEE ADMIN INSTRUCTIONS
Qty: 57 TABLET | Refills: 0 | Status: SHIPPED | OUTPATIENT
Start: 2022-09-12

## 2022-09-12 RX ORDER — ALBUTEROL SULFATE 90 UG/1
2 AEROSOL, METERED RESPIRATORY (INHALATION) EVERY 6 HOURS PRN
Qty: 18 G | Refills: 1 | Status: SHIPPED | OUTPATIENT
Start: 2022-09-12

## 2022-09-12 ASSESSMENT — ENCOUNTER SYMPTOMS
WHEEZING: 1
GASTROINTESTINAL NEGATIVE: 1
EYES NEGATIVE: 1
SHORTNESS OF BREATH: 1

## 2022-09-12 NOTE — PROGRESS NOTES
Subjective:      Patient ID: Warren Lyn is a 47 y.o. male. HPI  Essential hypertension   No change in meds, no c/o with meds, no chest pain, SOB, palpatations, or syncope. Home bp was not taken. Still smoking 3/4 pk/d. Hasn't tried to stop recently. Myocardial infarction ([address]   Elevated enzymes w/ normal cath 1/2015. Still smoking. Hasn't seen Cardiologist in years. Still on aspirin and BP med. Rectal bleeding   Completed iron infusion. No rectal bleeding recently. EGD and Colonoscopy were ok. Tobacco use disorder   Long time smoker. Has not tried to stop recently. Mild intermittent asthma   Smoking more some SOB, doesn't have inhaler at present. Lipoma of neck   Lipoma neck /chest anterior. been present for years but increasing in size. hyperpigmentation of skin over area. Review of Systems   Constitutional: Negative. HENT: Negative. Eyes: Negative. Respiratory:  Positive for shortness of breath and wheezing. Cardiovascular: Negative. Gastrointestinal: Negative. Genitourinary: Negative. Musculoskeletal: Negative. Skin: Negative. Lipoma Neck/chest    Neurological: Negative. Psychiatric/Behavioral: Negative. Vitals:    09/12/22 1033 09/12/22 1103   BP: 120/70 126/82   Site: Left Upper Arm    Position: Sitting    Cuff Size: Large Adult    Pulse: 87    SpO2: 97%    Weight: 210 lb (95.3 kg)    Height: 6' 4\" (1.93 m)          Objective:   Physical Exam  Constitutional:       General: He is not in acute distress. Appearance: Normal appearance. He is well-developed. He is not diaphoretic. HENT:      Head: Normocephalic and atraumatic. Neck:      Thyroid: No thyroid mass or thyromegaly. Vascular: Normal carotid pulses. No carotid bruit, hepatojugular reflux or JVD. Trachea: Trachea and phonation normal.   Cardiovascular:      Rate and Rhythm: Normal rate and regular rhythm. No extrasystoles are present.      Chest Wall: PMI is not displaced. Pulses: Normal pulses. No decreased pulses. Carotid pulses are 2+ on the right side and 2+ on the left side. Radial pulses are 2+ on the right side and 2+ on the left side. Femoral pulses are 2+ on the right side and 2+ on the left side. Popliteal pulses are 2+ on the right side and 2+ on the left side. Dorsalis pedis pulses are 2+ on the right side and 2+ on the left side. Posterior tibial pulses are 2+ on the right side and 2+ on the left side. Heart sounds: Normal heart sounds. No murmur heard. No friction rub. No gallop. Pulmonary:      Effort: Pulmonary effort is normal. No tachypnea, bradypnea, accessory muscle usage or respiratory distress. Breath sounds: Normal breath sounds. No decreased breath sounds, wheezing, rhonchi or rales. Abdominal:      Hernia: No hernia is present. There is no hernia in the ventral area. Musculoskeletal:      Cervical back: Full passive range of motion without pain, normal range of motion and neck supple. Skin:     General: Skin is warm and dry. Coloration: Skin is not pale. Findings: Lesion (lipoma anterior upper chest.) present. No abrasion or rash. Nails: There is no clubbing. Neurological:      Mental Status: He is alert and oriented to person, place, and time. He is not disoriented. Cranial Nerves: No cranial nerve deficit. Sensory: No sensory deficit. Motor: No tremor, atrophy or abnormal muscle tone. Coordination: Coordination normal.   Psychiatric:         Speech: Speech normal.         Behavior: Behavior normal.         Thought Content: Thought content normal.         Judgment: Judgment normal.       Assessment / Plan:        Lipoma of Neck. To surgery for removal. Pt request.     Rectal Bleeding. Will do labs. Discussed using baby wipes and fiber. Essential Hypertension. Continue present meds. Home BP checks. Call if>140/90. Improve diet.  Avoid caffeine and salt. Call if new c/o w/ meds. Myocardial Infarction ([facility]). To call if new c/o. Will rechx cholesterol. Tobacco Use Disorder. Discussed cessation. Will give rx for chantix. Discussed stopping if worsening depression or side effect. Mild Intermittent Asthma. Will refill Albuterol. To stop smoking and report worsening c/o.

## 2022-09-12 NOTE — PATIENT INSTRUCTIONS
Lipoma of Neck. To surgery for removal. Pt request.     Rectal Bleeding. Will do labs. Discussed using baby wipes and fiber. Essential Hypertension. Continue present meds. Home BP checks. Call if>140/90. Improve diet. Avoid caffeine and salt. Call if new c/o w/ meds. Myocardial Infarction ([facility]). To call if new c/o. Will rechx cholesterol. Tobacco Use Disorder. Discussed cessation. Will give rx for chantix. Discussed stopping if worsening depression or side effect. Mild Intermittent Asthma. Will refill Albuterol. To stop smoking and report worsening c/o.

## 2022-09-12 NOTE — ASSESSMENT & PLAN NOTE
Elevated enzymes w/ normal cath 1/2015. Still smoking. Hasn't seen Cardiologist in years. Still on aspirin and BP med.

## 2022-09-12 NOTE — ASSESSMENT & PLAN NOTE
No change in meds, no c/o with meds, no chest pain, SOB, palpatations, or syncope. Home bp was not taken. Still smoking 3/4 pk/d. Hasn't tried to stop recently.

## 2023-03-13 ENCOUNTER — OFFICE VISIT (OUTPATIENT)
Dept: INTERNAL MEDICINE CLINIC | Age: 53
End: 2023-03-13

## 2023-03-13 ENCOUNTER — HOSPITAL ENCOUNTER (OUTPATIENT)
Age: 53
Discharge: HOME | End: 2023-03-13
Payer: COMMERCIAL

## 2023-03-13 VITALS
OXYGEN SATURATION: 97 % | WEIGHT: 214 LBS | HEIGHT: 76 IN | BODY MASS INDEX: 26.06 KG/M2 | DIASTOLIC BLOOD PRESSURE: 80 MMHG | SYSTOLIC BLOOD PRESSURE: 120 MMHG | HEART RATE: 91 BPM

## 2023-03-13 DIAGNOSIS — D17.0 LIPOMA OF NECK: ICD-10-CM

## 2023-03-13 DIAGNOSIS — D50.0 IRON DEFICIENCY ANEMIA DUE TO CHRONIC BLOOD LOSS: ICD-10-CM

## 2023-03-13 DIAGNOSIS — I25.10 CORONARY ARTERY DISEASE DUE TO LIPID RICH PLAQUE: ICD-10-CM

## 2023-03-13 DIAGNOSIS — I25.83 CORONARY ARTERY DISEASE DUE TO LIPID RICH PLAQUE: ICD-10-CM

## 2023-03-13 DIAGNOSIS — I10 ESSENTIAL HYPERTENSION: Primary | ICD-10-CM

## 2023-03-13 LAB
A/G RATIO: 1.4 (ref 1.1–2.2)
ALBUMIN SERPL-MCNC: 4.5 G/DL (ref 3.4–5)
ALP BLD-CCNC: 57 U/L (ref 40–129)
ALT SERPL-CCNC: 34 U/L (ref 10–40)
ANION GAP SERPL CALCULATED.3IONS-SCNC: 14 MMOL/L (ref 3–16)
AST SERPL-CCNC: 35 U/L (ref 15–37)
BASOPHILS ABSOLUTE: 0.1 K/UL (ref 0–0.2)
BASOPHILS RELATIVE PERCENT: 2.5 %
BILIRUB SERPL-MCNC: <0.2 MG/DL (ref 0–1)
BUN BLDV-MCNC: 16 MG/DL (ref 7–20)
CALCIUM SERPL-MCNC: 9.7 MG/DL (ref 8.3–10.6)
CHLORIDE BLD-SCNC: 105 MMOL/L (ref 99–110)
CO2: 23 MMOL/L (ref 21–32)
CREAT SERPL-MCNC: 1.1 MG/DL (ref 0.9–1.3)
EOSINOPHILS ABSOLUTE: 0.3 K/UL (ref 0–0.6)
EOSINOPHILS RELATIVE PERCENT: 6.6 %
FERRITIN: 14.3 NG/ML (ref 30–400)
GFR SERPL CREATININE-BSD FRML MDRD: >60 ML/MIN/{1.73_M2}
GLUCOSE BLD-MCNC: 105 MG/DL (ref 70–99)
HCT VFR BLD CALC: 40.5 % (ref 40.5–52.5)
HEMOGLOBIN: 13 G/DL (ref 13.5–17.5)
IRON SATURATION: 6 % (ref 20–50)
IRON: 34 UG/DL (ref 59–158)
LYMPHOCYTES ABSOLUTE: 1.7 K/UL (ref 1–5.1)
LYMPHOCYTES RELATIVE PERCENT: 37.7 %
MCH RBC QN AUTO: 28.2 PG (ref 26–34)
MCHC RBC AUTO-ENTMCNC: 32 G/DL (ref 31–36)
MCV RBC AUTO: 88.2 FL (ref 80–100)
MONOCYTES ABSOLUTE: 0.6 K/UL (ref 0–1.3)
MONOCYTES RELATIVE PERCENT: 13.2 %
NEUTROPHILS ABSOLUTE: 1.8 K/UL (ref 1.7–7.7)
NEUTROPHILS RELATIVE PERCENT: 40 %
PDW BLD-RTO: 15.2 % (ref 12.4–15.4)
PLATELET # BLD: 276 K/UL (ref 135–450)
PMV BLD AUTO: 8.6 FL (ref 5–10.5)
POTASSIUM SERPL-SCNC: 4.5 MMOL/L (ref 3.5–5.1)
RBC # BLD: 4.59 M/UL (ref 4.2–5.9)
SODIUM BLD-SCNC: 142 MMOL/L (ref 136–145)
TOTAL IRON BINDING CAPACITY: 525 UG/DL (ref 260–445)
TOTAL PROTEIN: 7.8 G/DL (ref 6.4–8.2)
WBC # BLD: 4.4 K/UL (ref 4–11)

## 2023-03-13 PROCEDURE — 85025 COMPLETE CBC W/AUTO DIFF WBC: CPT

## 2023-03-13 PROCEDURE — 83540 ASSAY OF IRON: CPT

## 2023-03-13 PROCEDURE — 83036 HEMOGLOBIN GLYCOSYLATED A1C: CPT

## 2023-03-13 PROCEDURE — 83550 IRON BINDING TEST: CPT

## 2023-03-13 PROCEDURE — 36415 COLL VENOUS BLD VENIPUNCTURE: CPT

## 2023-03-13 PROCEDURE — 82728 ASSAY OF FERRITIN: CPT

## 2023-03-13 PROCEDURE — 80053 COMPREHEN METABOLIC PANEL: CPT

## 2023-03-13 RX ORDER — ALBUTEROL SULFATE 90 UG/1
2 AEROSOL, METERED RESPIRATORY (INHALATION) EVERY 6 HOURS PRN
Qty: 18 G | Refills: 1 | Status: SHIPPED | OUTPATIENT
Start: 2023-03-13

## 2023-03-13 ASSESSMENT — PATIENT HEALTH QUESTIONNAIRE - PHQ9
SUM OF ALL RESPONSES TO PHQ QUESTIONS 1-9: 0
10. IF YOU CHECKED OFF ANY PROBLEMS, HOW DIFFICULT HAVE THESE PROBLEMS MADE IT FOR YOU TO DO YOUR WORK, TAKE CARE OF THINGS AT HOME, OR GET ALONG WITH OTHER PEOPLE: 0
SUM OF ALL RESPONSES TO PHQ QUESTIONS 1-9: 0
4. FEELING TIRED OR HAVING LITTLE ENERGY: 0
9. THOUGHTS THAT YOU WOULD BE BETTER OFF DEAD, OR OF HURTING YOURSELF: 0
2. FEELING DOWN, DEPRESSED OR HOPELESS: 0
1. LITTLE INTEREST OR PLEASURE IN DOING THINGS: 0
6. FEELING BAD ABOUT YOURSELF - OR THAT YOU ARE A FAILURE OR HAVE LET YOURSELF OR YOUR FAMILY DOWN: 0
7. TROUBLE CONCENTRATING ON THINGS, SUCH AS READING THE NEWSPAPER OR WATCHING TELEVISION: 0
8. MOVING OR SPEAKING SO SLOWLY THAT OTHER PEOPLE COULD HAVE NOTICED. OR THE OPPOSITE, BEING SO FIGETY OR RESTLESS THAT YOU HAVE BEEN MOVING AROUND A LOT MORE THAN USUAL: 0
3. TROUBLE FALLING OR STAYING ASLEEP: 0
SUM OF ALL RESPONSES TO PHQ9 QUESTIONS 1 & 2: 0
5. POOR APPETITE OR OVEREATING: 0
SUM OF ALL RESPONSES TO PHQ QUESTIONS 1-9: 0
SUM OF ALL RESPONSES TO PHQ QUESTIONS 1-9: 0

## 2023-03-13 NOTE — PATIENT INSTRUCTIONS
Hypertension:  Blood pressure normal today. History of rectal bleeding consistent with hemorrhoids and subsequent iron deficiency anemia:   Check CBC and iron studies today. History of coronary artery disease with a history of myocardial infarction:  Continue taking aspirin 81 mg daily and start back taking atorvastatin (Lipitor) 40 mg every evening. Anxious depression due to serotonin depletion with insomnia:  Can start back on citalopram (Celexa) if needed. Mild intermittent asthma:  Use albuterol inhaler as needed.    Chest wall lipoma:  Refer to general surgery for removal.       Follow-up after evaluation for lipoma

## 2023-03-13 NOTE — PROGRESS NOTES
SUBJECTIVE:  Follow up from 22 (Dr Aminata Kruger) for hypertension on amlodipine and dyslipidemia on atorvastatin. Has not had refills. Only taking vitamins and aspirin. Ran out of albuterol inhaler several months ago. Has insurance and would like referral for chest wall lipoma. Iron deficiency anemia s/p Venofer infusions (2022). Currently smokes 2-3 cigs/day. History of present illness:   History of rectal bleeding for the past 8 years intermittently, usually after a bowel movement. He thinks he may have polyps, which may be hemorrhoids. He is usually follows at the [facility] ([address] [facility], Dr. Jos Juan), last seen in (2021). Unable to get a colonoscopy due to cost and sliding fee scale. He was referred to [facility] by employee. History of MI fours years ago, treated at [facility] with angioplasty. He is on aspirin and atorvastatin. He is active smoker currently at 2 cigs/day with a 20 pk/yr smoking history. He uses an albuterol inhaler infrequently 2-4 times per month. Great uncle  of colon cancer age 62s. No other family history of colorectal cancer. Mother with lung cancer (smoker).      MEDICATIONS:  albuterol sulfate HFA (PROVENTIL;VENTOLIN;PROAIR) 108 (90 Base) MCG/ACT inhaler Inhale 2 puffs into the lungs every 6 hours as needed for Wheezing   vitamin C (ASCORBIC ACID) 500 MG tablet Take 1,000 mg by mouth daily   vitamin D3 (CHOLECALCIFEROL) 10 MCG (400 UNIT) TABS tablet Take 400 Units by mouth daily   Multiple Vitamins-Minerals (MENS MULTIPLUS PO) Take by mouth   aspirin 81 MG tablet Take 81 mg by mouth daily    iron sucrose (VENOFER) 20 MG/ML injection Infuse 10 mLs intravenously daily for 3 doses   amLODIPine (NORVASC) 5 MG tablet Take 1 tablet by mouth daily (not taking)   atorvastatin (LIPITOR) 40 MG tablet Take 1 tablet by mouth daily Unsure of his strength (Patient not taking: Reported on 3/13/2023)       Lab Results   Component Value Date WBC 3.3 (L) 2022    HGB 12.3 (L) 2022     2022    MCV 92.6 2022     Lab Results   Component Value Date     2022    K 4.0 2022     2022    CO2 21 2022    BUN 12 2022    CREATININE 1.0 2022    GLUCOSE 106 (H) 2022     History of present illness:   History of rectal bleeding for the past 8 years intermittently, usually after a bowel movement. He thinks he may have polyps, which may be hemorrhoids. He is usually follows at the [facility] ([address] [facility], Dr. Naresh Quach), last seen in mid February. Blood tests apparently showed anemia, started on iron tablet three weeks ago. Unable to get a colonoscopy due to cost and sliding fee scale. He was referred to [facility] by employee. History of MI fours years ago, treated at [address] [place] [address] with angioplasty. He is on aspirin and atorvastatin. He is active smoker currently at 2 cigs/day with a 20 pk/yr smoking history. He uses an albuterol inhaler infrequently 2-4 times per month. Great uncle  of colon cancer age 62s. No other family history of colorectal cancer. Mother with lung cancer (smoker). Colonoscopy  and EGD  (Dr. Brennan Pruitt). Colonoscopy showed diverticulosis and hemorrhoids. EGD c/w gastritis, started on Nexium by Dr. Brennan Pruitt. PSA (22) 0.77 ng/mL  Fe / TIBC () 25 / 513 = 5% iron saturation     OBJECTIVE:    /80   Pulse 91   Ht 6' 4\" (1.93 m)   Wt 214 lb (97.1 kg)   SpO2 97%   BMI 26.05 kg/m²   HEENT:  Oropharynx clear, no lymphadenopathy,   LUNGS:  Clear and without wheezes  HEART:  Regular rhythm, no appreciable murmur  ABD:  Benign, active bowel sounds  EXT:  No edema, pulses palpable  NEURO:  No focal neurologic deficits noted. ASSESSMENT / PLAN:   Hypertension:  Blood pressure normal today.     History of rectal bleeding consistent with hemorrhoids and subsequent iron deficiency anemia:   Check CBC and iron studies today.   History of coronary artery disease with a history of myocardial infarction:  Continue taking aspirin 81 mg daily and start back taking atorvastatin (Lipitor) 40 mg every evening.    Anxious depression due to serotonin depletion with insomnia:  Can start back on citalopram (Celexa) if needed.   Mild intermittent asthma:  Use albuterol inhaler as needed.   Chest wall lipoma:  Refer to general surgery for removal.       Follow-up after evaluation for lipoma

## 2023-03-14 ENCOUNTER — TELEPHONE (OUTPATIENT)
Dept: INTERNAL MEDICINE CLINIC | Age: 53
End: 2023-03-14

## 2023-03-14 LAB
EST. AVERAGE GLUCOSE BLD GHB EST-MCNC: 108.3 MG/DL
HBA1C MFR BLD: 5.4 %

## 2023-03-19 RX ORDER — FERROUS SULFATE 325(65) MG
325 TABLET ORAL
Qty: 30 TABLET | Refills: 5 | Status: SHIPPED | OUTPATIENT
Start: 2023-03-19 | End: 2023-03-24

## 2023-03-19 NOTE — TELEPHONE ENCOUNTER
Ferritin (3/13) 14.3 ng/mL and Fe / TIBC (3/13) 34 / 525 = 6% iron saturation consistent with iron deficiency, similar to 1/27/22. Hemoglobin 13.0 gm/dL and MCV 88.2 consistent with mild normocytic anemia. Colonoscopy 5/2 and EGD 5/23 (Dr. Kelly Reddy). Colonoscopy showed diverticulosis and hemorrhoids. EGD c/w gastritis, started on Nexium by Dr. Kelly Reddy. Prescribe iron replacement therapy and consider Venofer infusion at infusion center. Discuss next visit after evaluation for lipoma.        Maged White MD

## 2023-03-22 ENCOUNTER — INITIAL CONSULT (OUTPATIENT)
Dept: SURGERY | Age: 53
End: 2023-03-22
Payer: COMMERCIAL

## 2023-03-22 VITALS
DIASTOLIC BLOOD PRESSURE: 82 MMHG | SYSTOLIC BLOOD PRESSURE: 139 MMHG | BODY MASS INDEX: 26.06 KG/M2 | WEIGHT: 214 LBS | HEIGHT: 76 IN | HEART RATE: 93 BPM

## 2023-03-22 DIAGNOSIS — D17.0 LIPOMA OF NECK: Primary | ICD-10-CM

## 2023-03-22 PROCEDURE — 3078F DIAST BP <80 MM HG: CPT | Performed by: SURGERY

## 2023-03-22 PROCEDURE — 3074F SYST BP LT 130 MM HG: CPT | Performed by: SURGERY

## 2023-03-22 PROCEDURE — 99243 OFF/OP CNSLTJ NEW/EST LOW 30: CPT | Performed by: SURGERY

## 2023-03-23 ENCOUNTER — TELEPHONE (OUTPATIENT)
Dept: SURGERY | Age: 53
End: 2023-03-23

## 2023-03-23 NOTE — TELEPHONE ENCOUNTER
Pt saw Dr Nan Snyder in the office 3/22/23 and was given surgery instructions for a Lipoma Excision of Neck ([facility]) scheduled 3/28/23 @ 2:15pm arrival 1:15pm [facility] - Pt understood and agreed w/ above noted

## 2023-03-27 NOTE — PROGRESS NOTES
Department of General Surgery Consult    PATIENT NAME: Ericka Becerril OF BIRTH: 1970    ADMISSION DATE: No admission date for patient encounter. TODAY'S DATE: 3/22/23    Reason for Consult:  neck mass    Chief Complaint: increased size    Requesting Physician:  Ana Gil    HISTORY OF PRESENT ILLNESS:              The patient is a 46 y.o. male who presents with mass of anterior neck. Has had for years with progressive increase in size. No pain. No drainage. No issues with breathing or swallowing. .    Past Medical History:        Diagnosis Date    Anemia     Asthma     CAD (coronary artery disease)     Drug abuse ([facility] [address]     smoke , and cocaine use - last used 10/2021    Heart attack ([facility] [address]     Hyperlipidemia     Hypertension     Rectal bleeding        Past Surgical History:        Procedure Laterality Date    CARDIAC CATHETERIZATION      2014 at [facility]    COLONOSCOPY  05/02/2018    normal    UPPER GASTROINTESTINAL ENDOSCOPY  05/23/2018    bx       Current Medications:   No current facility-administered medications for this visit. Prior to Admission medications    Medication Sig Start Date End Date Taking?  Authorizing Provider   albuterol sulfate HFA (PROVENTIL;VENTOLIN;PROAIR) 108 (90 Base) MCG/ACT inhaler Inhale 2 puffs into the lungs every 6 hours as needed for Wheezing 3/13/23  Yes Governor Becky MD   vitamin C (ASCORBIC ACID) 500 MG tablet Take 1,000 mg by mouth daily   Yes Historical Provider, MD   vitamin D3 (CHOLECALCIFEROL) 10 MCG (400 UNIT) TABS tablet Take 400 Units by mouth daily   Yes Historical Provider, MD   atorvastatin (LIPITOR) 40 MG tablet Take 1 tablet by mouth daily Unsure of his strength 1/13/22  Yes Governor Becky MD   Multiple Vitamins-Minerals (MENS MULTIPLUS PO) Take by mouth   Yes Historical Provider, MD   aspirin 81 MG tablet Take 81 mg by mouth daily    Yes Historical Provider, MD        Allergies:  Pcn [penicillins]    Social History:

## 2023-03-29 ENCOUNTER — TELEPHONE (OUTPATIENT)
Dept: SURGERY | Age: 53
End: 2023-03-29

## 2023-03-29 NOTE — TELEPHONE ENCOUNTER
Pt called and would like to reschedule his sx with Dr. Amalia Mcgregor that was supposed to be yesterday 3/28 but it was canceled. It's for Lipoma excision of the neck. Please call him and thank you!

## 2023-04-24 ENCOUNTER — OFFICE VISIT (OUTPATIENT)
Dept: SURGERY | Age: 53
End: 2023-04-24

## 2023-04-24 VITALS
WEIGHT: 211 LBS | HEIGHT: 76 IN | BODY MASS INDEX: 25.69 KG/M2 | SYSTOLIC BLOOD PRESSURE: 165 MMHG | DIASTOLIC BLOOD PRESSURE: 101 MMHG | HEART RATE: 104 BPM

## 2023-04-24 DIAGNOSIS — L72.3 SEBACEOUS CYST: Primary | ICD-10-CM

## 2023-08-11 SDOH — ECONOMIC STABILITY: FOOD INSECURITY: WITHIN THE PAST 12 MONTHS, THE FOOD YOU BOUGHT JUST DIDN'T LAST AND YOU DIDN'T HAVE MONEY TO GET MORE.: NEVER TRUE

## 2023-08-11 SDOH — ECONOMIC STABILITY: HOUSING INSECURITY
IN THE LAST 12 MONTHS, WAS THERE A TIME WHEN YOU DID NOT HAVE A STEADY PLACE TO SLEEP OR SLEPT IN A SHELTER (INCLUDING NOW)?: NO

## 2023-08-11 SDOH — ECONOMIC STABILITY: INCOME INSECURITY: HOW HARD IS IT FOR YOU TO PAY FOR THE VERY BASICS LIKE FOOD, HOUSING, MEDICAL CARE, AND HEATING?: NOT VERY HARD

## 2023-08-11 SDOH — ECONOMIC STABILITY: FOOD INSECURITY: WITHIN THE PAST 12 MONTHS, YOU WORRIED THAT YOUR FOOD WOULD RUN OUT BEFORE YOU GOT MONEY TO BUY MORE.: NEVER TRUE

## 2023-08-11 SDOH — ECONOMIC STABILITY: TRANSPORTATION INSECURITY
IN THE PAST 12 MONTHS, HAS LACK OF TRANSPORTATION KEPT YOU FROM MEETINGS, WORK, OR FROM GETTING THINGS NEEDED FOR DAILY LIVING?: NO

## 2023-08-14 ENCOUNTER — OFFICE VISIT (OUTPATIENT)
Dept: INTERNAL MEDICINE CLINIC | Age: 53
End: 2023-08-14

## 2023-08-14 VITALS
BODY MASS INDEX: 27.1 KG/M2 | WEIGHT: 218 LBS | DIASTOLIC BLOOD PRESSURE: 78 MMHG | SYSTOLIC BLOOD PRESSURE: 120 MMHG | HEIGHT: 75 IN

## 2023-08-14 DIAGNOSIS — I25.83 CORONARY ARTERY DISEASE DUE TO LIPID RICH PLAQUE: ICD-10-CM

## 2023-08-14 DIAGNOSIS — I25.10 CORONARY ARTERY DISEASE DUE TO LIPID RICH PLAQUE: ICD-10-CM

## 2023-08-14 DIAGNOSIS — D50.0 IRON DEFICIENCY ANEMIA DUE TO CHRONIC BLOOD LOSS: ICD-10-CM

## 2023-08-14 DIAGNOSIS — I10 ESSENTIAL HYPERTENSION: Primary | ICD-10-CM

## 2023-08-14 DIAGNOSIS — F41.8 ANXIOUS DEPRESSION: ICD-10-CM

## 2023-08-14 DIAGNOSIS — J45.20 MILD INTERMITTENT ASTHMA WITHOUT COMPLICATION: ICD-10-CM

## 2023-08-14 RX ORDER — ALBUTEROL SULFATE 90 UG/1
2 AEROSOL, METERED RESPIRATORY (INHALATION) EVERY 6 HOURS PRN
Qty: 18 G | Refills: 1 | Status: SHIPPED | OUTPATIENT
Start: 2023-08-14

## 2023-08-14 RX ORDER — FERROUS SULFATE 325(65) MG
325 TABLET ORAL
Qty: 30 TABLET | Refills: 5 | Status: SHIPPED | OUTPATIENT
Start: 2023-08-14

## 2023-08-14 NOTE — PATIENT INSTRUCTIONS
Primary hypertension:  Blood pressure normal today. No medication. Left cheek lesion:  Cover with 2% mupirocin ointment THREE times per day for a week. History of rectal bleeding consistent with hemorrhoids and subsequent iron deficiency anemia:   Hemoglobin is reasonable at 13.0 gm/dL but the iron saturation is low at 6% (goal above 20%). Colonoscopy and EGD negative in May 2018. START taking ferrous sulfate 325 mg tablet every OTHER day with 500 mg of vitamin C to improve absorption. History of coronary artery disease with a history of myocardial infarction:  Continue taking aspirin 81 mg daily for secondary prevention and atorvastatin (Lipitor) 40 mg every evening. Anxious depression due to serotonin depletion with insomnia:  Can start back on citalopram (Celexa) if needed. Mild intermittent asthma:  Use albuterol inhaler as needed.        Follow-up two months

## 2023-08-14 NOTE — PROGRESS NOTES
SUBJECTIVE:  Follow up from 3/31/23 for hypertension, CAD, depression. Anterior neck lipoma excision (, Dr Dilma Carr). Has been 1.5 years since son committed suicide. Iron deficiency anemia s/p Venofer infusions (2022). Currently smokes 2-3 cigs/day    History of present illness:   History of rectal bleeding for the past 8 years intermittently, usually after a bowel movement. He thinks he may have polyps, which may be hemorrhoids. He is usually follows at the [facility] ([address], Dr. Alver Bosworth), last seen in (2021). History of MI () treated at [facility] with angioplasty and continues on aspirin and atorvastatin. He is active smoker currently at 2 cigs/day with a 20 pk/yr smoking history. He uses an albuterol inhaler infrequently 2-4 times per month. Great uncle  of colon cancer age 62s. No other family history of colorectal cancer. Mother with lung cancer (smoker). Colonoscopy and EGD (May 2018, Dr. Ivonne Neves). Colonoscopy showed diverticulosis and hemorrhoids. EGD c/w gastritis, started on Nexium by Dr. Ivonne Neves.             MEDICATIONS:  albuterol sulfate HFA (PROVENTIL;VENTOLIN;PROAIR) 108 (90 Base) MCG/ACT inhaler Inhale 2 puffs into the lungs every 6 hours as needed for Wheezing   vitamin C (ASCORBIC ACID) 500 MG tablet Take 2 tablets by mouth daily   vitamin D3 (CHOLECALCIFEROL) 10 MCG (400 UNIT) TABS tablet Take 1 tablet by mouth daily   Multiple Vitamins-Minerals (MENS MULTIPLUS PO) Take by mouth   aspirin 81 MG tablet Take 1 tablet by mouth daily         PSA (22) 0.77 ng/mL  Fe / TIBC () 25 / 513 = 5% iron saturation   Fe / TIBC (3/13) 34 / 525 = 6% iron saturation    HgbA1c (3/13/23) 5.4%    Lab Results   Component Value Date    WBC 4.4 2023    HGB 13.0 (L) 2023     2023    MCV 88.2 2023     Lab Results   Component Value Date     2023    K 4.5 2023     2023    CO2 23 2023

## 2023-10-09 ENCOUNTER — OFFICE VISIT (OUTPATIENT)
Dept: INTERNAL MEDICINE CLINIC | Age: 53
End: 2023-10-09

## 2023-10-09 ENCOUNTER — HOSPITAL ENCOUNTER (OUTPATIENT)
Age: 53
Setting detail: SPECIMEN
Discharge: HOME | End: 2023-10-09
Payer: COMMERCIAL

## 2023-10-09 VITALS
HEIGHT: 76 IN | SYSTOLIC BLOOD PRESSURE: 120 MMHG | OXYGEN SATURATION: 98 % | DIASTOLIC BLOOD PRESSURE: 80 MMHG | WEIGHT: 216 LBS | BODY MASS INDEX: 26.3 KG/M2

## 2023-10-09 DIAGNOSIS — F41.8 ANXIOUS DEPRESSION: ICD-10-CM

## 2023-10-09 DIAGNOSIS — D50.0 IRON DEFICIENCY ANEMIA DUE TO CHRONIC BLOOD LOSS: Primary | ICD-10-CM

## 2023-10-09 DIAGNOSIS — I25.10 CORONARY ARTERY DISEASE DUE TO LIPID RICH PLAQUE: ICD-10-CM

## 2023-10-09 DIAGNOSIS — D50.0 IRON DEFICIENCY ANEMIA DUE TO CHRONIC BLOOD LOSS: ICD-10-CM

## 2023-10-09 DIAGNOSIS — I25.83 CORONARY ARTERY DISEASE DUE TO LIPID RICH PLAQUE: ICD-10-CM

## 2023-10-09 DIAGNOSIS — K62.5 RECTAL BLEEDING: ICD-10-CM

## 2023-10-09 LAB
BASOPHILS # BLD: 0.1 K/UL (ref 0–0.2)
BASOPHILS NFR BLD: 2.9 %
DEPRECATED RDW RBC AUTO: 21.4 % (ref 12.4–15.4)
EOSINOPHIL # BLD: 0.1 K/UL (ref 0–0.6)
EOSINOPHIL NFR BLD: 2.8 %
HCT VFR BLD AUTO: 39.1 % (ref 40.5–52.5)
HGB BLD-MCNC: 12.8 G/DL (ref 13.5–17.5)
LYMPHOCYTES # BLD: 1.4 K/UL (ref 1–5.1)
LYMPHOCYTES NFR BLD: 31.9 %
MCH RBC QN AUTO: 29.1 PG (ref 26–34)
MCHC RBC AUTO-ENTMCNC: 32.7 G/DL (ref 31–36)
MCV RBC AUTO: 89 FL (ref 80–100)
MONOCYTES # BLD: 0.6 K/UL (ref 0–1.3)
MONOCYTES NFR BLD: 13.7 %
NEUTROPHILS # BLD: 2.1 K/UL (ref 1.7–7.7)
NEUTROPHILS NFR BLD: 48.7 %
PLATELET # BLD AUTO: 241 K/UL (ref 135–450)
PMV BLD AUTO: 8.7 FL (ref 5–10.5)
RBC # BLD AUTO: 4.4 M/UL (ref 4.2–5.9)
WBC # BLD AUTO: 4.3 K/UL (ref 4–11)

## 2023-10-09 PROCEDURE — 36415 COLL VENOUS BLD VENIPUNCTURE: CPT

## 2023-10-09 PROCEDURE — 85025 COMPLETE CBC W/AUTO DIFF WBC: CPT

## 2023-10-09 RX ORDER — FERROUS SULFATE 325(65) MG
325 TABLET ORAL
Qty: 30 TABLET | Refills: 5 | Status: SHIPPED | OUTPATIENT
Start: 2023-10-09

## 2023-10-09 NOTE — PROGRESS NOTES
SUBJECTIVE:  Follow up from  for hypertension, CAD, depression. History of hemorrhoids with episode of bleeding on Friday (10/6). He has not had an episode of hematochezia for over a year. Colonoscopy and EGD (May 2018, Dr. Suzi Wells). Colonoscopy showed diverticulosis and hemorrhoids. EGD c/w gastritis, started on Nexium by Dr. Suzi Wells. Iron deficiency anemia (iron saturation 6%) and has been taking ferrous sulfate since last visit (). History of present illness:   History of rectal bleeding for the past 8 years intermittently, usually after a bowel movement. He thinks he may have polyps, which may be hemorrhoids. He is usually follows at the [facility] ([address] [place] [address], Dr. Nahum Burnett), last seen in (2021). History of MI () treated at [facility] with angioplasty and continues on aspirin and atorvastatin. He is active smoker currently at 2 cigs/day with a 20 pk/yr smoking history. He uses an albuterol inhaler infrequently 2-4 times per month. Great uncle  of colon cancer age 62s. No other family history of colorectal cancer. Mother with lung cancer (smoker). Anterior neck lipoma excision (, Dr Piyush Moctezuma). Has been 1.5 years since son committed suicide. Iron deficiency anemia s/p Venofer infusions (2022). Currently smokes 2-3 cigs/day.         MEDICATIONS:  albuterol sulfate HFA (PROVENTIL;VENTOLIN;PROAIR) 108 (90 Base) MCG/ACT inhaler Inhale 2 puffs into the lungs every 6 hours as needed for Wheezing   ferrous sulfate (IRON 325) 325 (65 Fe) MG tablet Take 1 tablet by mouth daily (with breakfast)   vitamin C (ASCORBIC ACID) 500 MG tablet Take 2 tablets by mouth daily   vitamin D3 (CHOLECALCIFEROL) 10 MCG (400 UNIT) TABS tablet Take 1 tablet by mouth daily   Multiple Vitamins-Minerals (MENS MULTIPLUS PO) Take by mouth   aspirin 81 MG tablet Take 1 tablet by mouth daily       PSA (22) 0.77 ng/mL  Fe / TIBC () 25 / 513 = 5% iron saturation   Fe /

## 2023-10-09 NOTE — PATIENT INSTRUCTIONS
History of rectal bleeding consistent with hemorrhoids and subsequent iron deficiency anemia:  If another episode of hematochezia (blood per rectum) then will refer to general surgery for possible hemorrhoidectomy (Dr. Jannet Love). Iron deficiency anemia:  Hemoglobin is reasonable at 13.0 gm/dL in March but the iron saturation was low at 6% (goal above 20%). Continue taking ferrous sulfate 325 mg tablet every OTHER day with 500 mg of vitamin C to improve absorption. Check blood count today. History of coronary artery disease with a history of myocardial infarction:  Continue taking aspirin 81 mg daily for secondary prevention and atorvastatin (Lipitor) 40 mg every evening. Anxious depression due to serotonin depletion with insomnia:  Can start back on citalopram (Celexa) if needed. Mild intermittent asthma:  Use albuterol inhaler as needed. Primary hypertension:  Blood pressure normal today at 120/80. No medication.         Follow-up in six weeks

## 2023-11-20 ENCOUNTER — OFFICE VISIT (OUTPATIENT)
Dept: INTERNAL MEDICINE CLINIC | Age: 53
End: 2023-11-20

## 2023-11-20 ENCOUNTER — HOSPITAL ENCOUNTER (OUTPATIENT)
Age: 53
Discharge: HOME | End: 2023-11-20
Payer: COMMERCIAL

## 2023-11-20 VITALS
BODY MASS INDEX: 26.3 KG/M2 | SYSTOLIC BLOOD PRESSURE: 120 MMHG | HEART RATE: 96 BPM | HEIGHT: 76 IN | OXYGEN SATURATION: 95 % | WEIGHT: 216 LBS | DIASTOLIC BLOOD PRESSURE: 80 MMHG

## 2023-11-20 DIAGNOSIS — I25.10 CORONARY ARTERY DISEASE DUE TO LIPID RICH PLAQUE: ICD-10-CM

## 2023-11-20 DIAGNOSIS — D50.0 IRON DEFICIENCY ANEMIA DUE TO CHRONIC BLOOD LOSS: ICD-10-CM

## 2023-11-20 DIAGNOSIS — K62.5 RECTAL BLEEDING: Primary | ICD-10-CM

## 2023-11-20 DIAGNOSIS — I25.83 CORONARY ARTERY DISEASE DUE TO LIPID RICH PLAQUE: ICD-10-CM

## 2023-11-20 DIAGNOSIS — K62.5 RECTAL BLEEDING: ICD-10-CM

## 2023-11-20 LAB
BASOPHILS # BLD: 0.1 K/UL (ref 0–0.2)
BASOPHILS NFR BLD: 3 %
DEPRECATED RDW RBC AUTO: 16.9 % (ref 12.4–15.4)
EOSINOPHIL # BLD: 0.2 K/UL (ref 0–0.6)
EOSINOPHIL NFR BLD: 5.2 %
HCT VFR BLD AUTO: 44.6 % (ref 40.5–52.5)
HGB BLD-MCNC: 14.7 G/DL (ref 13.5–17.5)
IRON SATN MFR SERPL: 13 % (ref 20–50)
IRON SERPL-MCNC: 63 UG/DL (ref 59–158)
LYMPHOCYTES # BLD: 1.3 K/UL (ref 1–5.1)
LYMPHOCYTES NFR BLD: 38.6 %
MCH RBC QN AUTO: 30.8 PG (ref 26–34)
MCHC RBC AUTO-ENTMCNC: 32.9 G/DL (ref 31–36)
MCV RBC AUTO: 93.5 FL (ref 80–100)
MONOCYTES # BLD: 0.6 K/UL (ref 0–1.3)
MONOCYTES NFR BLD: 16.2 %
NEUTROPHILS # BLD: 1.3 K/UL (ref 1.7–7.7)
NEUTROPHILS NFR BLD: 37 %
PLATELET # BLD AUTO: 263 K/UL (ref 135–450)
PLATELET BLD QL SMEAR: ADEQUATE
PMV BLD AUTO: 9 FL (ref 5–10.5)
RBC # BLD AUTO: 4.77 M/UL (ref 4.2–5.9)
SLIDE REVIEW: ABNORMAL
TIBC SERPL-MCNC: 496 UG/DL (ref 260–445)
WBC # BLD AUTO: 3.4 K/UL (ref 4–11)

## 2023-11-20 PROCEDURE — 85025 COMPLETE CBC W/AUTO DIFF WBC: CPT

## 2023-11-20 PROCEDURE — 36415 COLL VENOUS BLD VENIPUNCTURE: CPT

## 2023-11-20 PROCEDURE — 83540 ASSAY OF IRON: CPT

## 2023-11-20 PROCEDURE — 83550 IRON BINDING TEST: CPT

## 2023-11-20 NOTE — PATIENT INSTRUCTIONS
Rectal bleeding consistent with hemorrhoids and subsequent iron deficiency anemia:  We refer to general surgery for possible hemorrhoidectomy (Dr. Tsang Covert). Iron deficiency anemia:  Hemoglobin is reasonable at 12.8 gm/dL a month ago. Check iron saturation today. Continue taking ferrous sulfate 325 mg tablet every OTHER day with 500 mg of vitamin C to improve absorption. History of coronary artery disease with a history of myocardial infarction:  Continue taking aspirin 81 mg daily. Mild intermittent asthma:  Use albuterol inhaler as needed. Primary hypertension:  Blood pressure normal today at 120/80. No medication.         Follow-up as seen by surgery

## 2023-11-20 NOTE — PROGRESS NOTES
SUBJECTIVE:  Follow up from 10/9 for rectal bleeding, Hgb 12.8 gm/dL. History of hemorrhoids with episodes of bleeding getting worse of the past month. He has not had an episode of hematochezia for over a year. Colonoscopy and EGD (May 2018, Dr. Martha Negron). Colonoscopy showed diverticulosis and hemorrhoids. EGD c/w gastritis, started on Nexium by Dr. Martha Negron. Iron deficiency anemia (iron saturation 6%) and has been taking ferrous sulfate since last visit (). History of present illness:   History of rectal bleeding for the past 8 years intermittently, usually after a bowel movement. He thinks he may have polyps, which may be hemorrhoids. He is usually follows at the [facility] ([address] [place] [address], Dr. Mikaela Mason), last seen in (2021). History of MI () treated at [facility] with angioplasty and continues on aspirin and atorvastatin. He is active smoker currently at 2 cigs/day with a 20 pk/yr smoking history. He uses an albuterol inhaler infrequently 2-4 times per month. Great uncle  of colon cancer age 62s. No other family history of colorectal cancer. Mother with lung cancer (smoker). Anterior neck lipoma excision (, Dr Nicole Kurtz). Has been 1.5 years since son committed suicide. Iron deficiency anemia s/p Venofer infusions (2022). Currently smokes 2-3 cigs/day.         MEDICATIONS:  ferrous sulfate (IRON 325) 325 (65 Fe) MG tablet Take 1 tablet by mouth daily (with breakfast)   albuterol sulfate HFA (PROVENTIL;VENTOLIN;PROAIR) 108 (90 Base) MCG/ACT inhaler Inhale 2 puffs into the lungs every 6 hours as needed for Wheezing   vitamin C (ASCORBIC ACID) 500 MG tablet Take 2 tablets by mouth daily   vitamin D3 (CHOLECALCIFEROL) 10 MCG (400 UNIT) TABS tablet Take 1 tablet by mouth daily   Multiple Vitamins-Minerals (MENS MULTIPLUS PO) Take by mouth   aspirin 81 MG tablet Take 1 tablet by mouth daily     PSA (22) 0.77 ng/mL  Fe / TIBC (22) 25 / 513 = 5% iron

## 2024-01-03 ENCOUNTER — INITIAL CONSULT (OUTPATIENT)
Dept: SURGERY | Age: 54
End: 2024-01-03

## 2024-01-03 VITALS
SYSTOLIC BLOOD PRESSURE: 183 MMHG | WEIGHT: 218.2 LBS | DIASTOLIC BLOOD PRESSURE: 98 MMHG | BODY MASS INDEX: 26.57 KG/M2 | HEART RATE: 106 BPM | HEIGHT: 76 IN

## 2024-01-03 DIAGNOSIS — K64.8 OTHER HEMORRHOIDS: Primary | ICD-10-CM

## 2024-01-05 NOTE — PROGRESS NOTES
[unfilled]  [facility]@      CONSTITUTIONAL:  alert, no apparent distress and normal weight  EYES:  PERRL, sclera clear  ENT:  Normocephalic,atraumatic, without obvious abnormality  NECK:  supple, symmetrical, trachea midline  LUNGS: Resp effort easy and unlabored,  CARDIOVASCULAR:  NO JVD, regular rate  ABDOMEN:  , normal bowel sounds, soft, non-distended, non-tender,   RECTAL:  multiple hemorrhoids and skin tags, one of hemorrhoids inflamed  MUSCULOSKELETAL: No clubbing or cyanosis, 0+ pitting edema lower extremities  NEUROLOGIC:  Mental Status Exam:  Level of Alertness:   awake  PSYCHIATRIC:   person, place, time  SKIN:  no redness, warmth, or swelling    DATA:    CBC: No results for input(s): \"WBC\", \"HGB\", \"HCT\", \"PLT\" in the last 72 hours.  BMP:  No results for input(s): \"NA\", \"K\", \"CL\", \"CO2\", \"BUN\", \"CREATININE\", \"GLUCOSE\" in the last 72 hours.  Hepatic: No results for input(s): \"AST\", \"ALT\", \"ALB\", \"BILITOT\", \"ALKPHOS\" in the last 72 hours.  Mag:    No results for input(s): \"MG\" in the last 72 hours.   Phos:   No results for input(s): \"PHOS\" in the last 72 hours.   INR: No results for input(s): \"INR\" in the last 72 hours.    Radiology Review: Images personally reviewed by me.   NA      IMPRESSION/RECOMMENDATIONS:    52 yo with hemorrhoids  Given persistent symptoms despite prior treatment will plan for excision in OR  Risks of operation and typical recovery discussed  He will check work schedule and then call us to set up    Electronically signed by Rogers Gurrola MD     [facility]  [id number]